# Patient Record
Sex: MALE | NOT HISPANIC OR LATINO | URBAN - METROPOLITAN AREA
[De-identification: names, ages, dates, MRNs, and addresses within clinical notes are randomized per-mention and may not be internally consistent; named-entity substitution may affect disease eponyms.]

---

## 2019-04-11 ENCOUNTER — IMPORTED ENCOUNTER (OUTPATIENT)
Dept: URBAN - METROPOLITAN AREA CLINIC 1 | Facility: CLINIC | Age: 69
End: 2019-04-11

## 2019-04-11 PROBLEM — H25.813: Noted: 2019-04-11

## 2019-04-11 PROBLEM — H43.811: Noted: 2019-04-11

## 2019-04-11 PROCEDURE — 92014 COMPRE OPH EXAM EST PT 1/>: CPT

## 2019-04-11 PROCEDURE — 92015 DETERMINE REFRACTIVE STATE: CPT

## 2019-04-11 NOTE — PATIENT DISCUSSION
1.  Cataracts OU -- Observe for now without intervention. The patient was advised to contact us if any change or worsening of vision. 2. PVD w/o Tear OD -- RD precautions given. Finalized Glasses MRx was given to patient today. Return for an appointment in 1 YR for a 27 / [address] with Dr. Patrick Cuevas.

## 2021-01-13 NOTE — PATIENT DISCUSSION
Advised patient that difficultly with vision is most likely due to the scarring along with significant astigmatism in the right eye.

## 2021-01-13 NOTE — PATIENT DISCUSSION
Doctor adjust made to manifest in the right eye.     Advised patient to get new glasses and give them a little time to see if patient can adjust to the Rx discrepancy between the eyes.

## 2021-10-21 ENCOUNTER — IMPORTED ENCOUNTER (OUTPATIENT)
Dept: URBAN - METROPOLITAN AREA CLINIC 1 | Facility: CLINIC | Age: 71
End: 2021-10-21

## 2021-10-21 PROBLEM — H43.811: Noted: 2021-10-21

## 2021-10-21 PROBLEM — H25.813: Noted: 2021-10-21

## 2021-10-21 PROCEDURE — 99214 OFFICE O/P EST MOD 30 MIN: CPT

## 2021-10-21 PROCEDURE — 92015 DETERMINE REFRACTIVE STATE: CPT

## 2021-10-21 NOTE — PATIENT DISCUSSION
1.  Cataracts OU - Observe for now without intervention. The patient was advised to contact us if any change or worsening of vision. 2. PVD w/o Tear OD - old/stable. Finalized Glasses MRx was given to patient today. Return for an appointment in 1 YR for a 27 / [address] with Dr. Dagmar Pollack.

## 2022-04-02 ASSESSMENT — TONOMETRY
OS_IOP_MMHG: 17
OD_IOP_MMHG: 17
OD_IOP_MMHG: 17
OS_IOP_MMHG: 17

## 2022-04-02 ASSESSMENT — VISUAL ACUITY
OS_GLARE: 20/70
OD_SC: 20/25
OD_GLARE: 20/70
OS_CC: 20/25
OD_CC: 20/25
OS_SC: 20/20-1